# Patient Record
Sex: MALE | URBAN - METROPOLITAN AREA
[De-identification: names, ages, dates, MRNs, and addresses within clinical notes are randomized per-mention and may not be internally consistent; named-entity substitution may affect disease eponyms.]

---

## 2020-08-06 ENCOUNTER — OFFICE VISIT (OUTPATIENT)
Dept: URBAN - METROPOLITAN AREA CLINIC 76 | Facility: CLINIC | Age: 60
End: 2020-08-06
Payer: COMMERCIAL

## 2020-08-06 DIAGNOSIS — H52.13 MYOPIA, BILATERAL: ICD-10-CM

## 2020-08-06 DIAGNOSIS — H53.2 DIPLOPIA: Primary | ICD-10-CM

## 2020-08-06 DIAGNOSIS — H25.13 AGE-RELATED NUCLEAR CATARACT, BILATERAL: ICD-10-CM

## 2020-08-06 PROCEDURE — 92004 COMPRE OPH EXAM NEW PT 1/>: CPT | Performed by: OPTOMETRIST

## 2020-08-06 ASSESSMENT — KERATOMETRY
OD: 43.75
OS: 43.00

## 2020-08-06 ASSESSMENT — INTRAOCULAR PRESSURE
OD: 15
OS: 16

## 2020-08-06 ASSESSMENT — VISUAL ACUITY
OD: 20/25
OS: 20/25

## 2020-08-06 NOTE — IMPRESSION/PLAN
Impression: Diplopia: H53.2. ? secondary to inflammation in muscle after hitting head during seizure
per patient improving Plan: Discussed diagnosis in detail with patient. Continue to monitor.

## 2020-08-06 NOTE — IMPRESSION/PLAN
Impression: Myopia, bilateral: H52.13. Bilateral. Plan: Discussed condition. New mrx given today. Pt to call with any concerns.  No Prism needed